# Patient Record
Sex: FEMALE | Race: WHITE | Employment: FULL TIME | URBAN - METROPOLITAN AREA
[De-identification: names, ages, dates, MRNs, and addresses within clinical notes are randomized per-mention and may not be internally consistent; named-entity substitution may affect disease eponyms.]

---

## 2018-09-20 ENCOUNTER — HOSPITAL ENCOUNTER (OUTPATIENT)
Dept: PHYSICAL THERAPY | Age: 28
Discharge: HOME | End: 2018-09-20
Payer: COMMERCIAL

## 2018-09-20 PROCEDURE — 97140 MANUAL THERAPY 1/> REGIONS: CPT | Performed by: PHYSICAL THERAPIST

## 2018-09-20 PROCEDURE — 97162 PT EVAL MOD COMPLEX 30 MIN: CPT | Performed by: PHYSICAL THERAPIST

## 2018-09-20 NOTE — PROGRESS NOTES
PHYSICAL THERAPY - DAILY TREATMENT NOTE    Patient Name: Alejandra Prieto        Date: 2018  : 1990   YES Patient  Verified  Visit #:      8  Insurance: Payor: Erika Murrell / Plan: [address] PT / Product Type: Commerical /      In time: 930 Out time: 1020   Total Treatment Time: 50     TREATMENT AREA =  Neck,    SUBJECTIVE  Pain Level (on 0 to 10 scale):  5  / 10   Medication Changes/New allergies or changes in medical history, any new surgeries or procedures? NO    If yes, update Summary List   Subjective Functional Status/Changes:  []  No changes reported     See IE       OBJECTIVE    10 min Manual Therapy: Technique:      [x] S/DTM []IASTM []PROM [x] Passive Stretching   []manual TPR    []Jt manipulation:Gr I [] II []  III [] IV[] V[]  Treatment Area:  C/s paraspinals, UT, OA release,    Rationale:      decrease pain, increase ROM, increase tissue extensibility and decrease trigger points to improve patient's ability to  improve patient's ability to perform ADL's with decreased pain      NC min Therapeutic Exercise:  [x]  See flow sheet   Rationale:      increase ROM and increase strength to improve the patients ability to  improve patient's ability to perform ADL's with decreased pain       throughout therapy min Patient Education:  YES  Reviewed HEP   []  Progressed/Changed HEP based on:   Educated the pt on anatomy of current condition, educated the patient on proper sleeping posture and initiated initial HEP.  See chart     Other Objective/Functional Measures:    See IE     Post Treatment Pain Level (on 0 to 10) scale:   5  / 10     ASSESSMENT  Assessment/Changes in Function:     See POC     []  See Progress Note/Recertification   Patient will continue to benefit from skilled PT services to see POC   Progress toward goals / Updated goals:    See POC     PLAN  [x]  Upgrade activities as tolerated YES Continue plan of care   []  Discharge due to :    []  Other:      Therapist: Hiwot Toribio KISHA Koo PT    Date: 9/20/2018 Time: 12:52 PM     Future Appointments  Date Time Provider Darian Whitt   9/24/2018 3:00 PM Raine Neville [facility] AT [facility]   9/28/2018 8:30 AM Courtney ESPOSITO PTA REHAB CENTER AT [facility]   10/2/2018 10:30 AM Noemi Koo, JENN REH[facility] CENTER AT [facility]

## 2018-09-20 NOTE — PROGRESS NOTES
[facility] PHYSICAL THERAPY AT [facility] [address] [place], [address] - Phone: [phone]  Fax: [phone] / [address] [facility]  Patient Name: Yvette Walker : 1990   Medical   Diagnosis: Neck pain [M54.2] Treatment Diagnosis: cervicalgia   Onset Date: 2018     Referral Source: Dayton Smith MD Start of Care [facility]): 2018   Prior Hospitalization: See medical history Provider #: [id number]   Prior Level of Function: Pain free   Comorbidities: SI joint pain   Medications: Verified on Patient Summary List   The Plan of Care and following information is based on the information from the initial evaluation. Assessment / key information:  The Ella Saravia is a 33 yo female [facility] [place] with c/o cervical pain due to insidious onset in 2018. 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 She denies numbness or radicular symptoms to UE's. She reports her max pain is a 7/10 which occurs with turning the \"wrong direction\" but isn't sure which way and lasts for short duration. Minimum pain = 0/10. Upon evaluation, the pt presents with poor posture - thoracic kyphosis, forward shoulders, forward head. She has decreased cervical ROM: flex 40, ext 43 degrees with increased cervical pain, R/L SB 26/24 degrees, B rotation = 40 degrees. UE strength is grossly 5/5 B throughout, sensation is intact to light touch. Special tests: cervical compression, Spurlings are (-), cervical traction is relieving. She has trigger points to R>L UT, LS, OA tightness, hypomobility to thoracic spine, hypomobile and tender C2, C4 vertebrae. Functionally she is limited with rotation making driving difficult and FOTO = 47/100 indicating functional limitations.   The pt would benefit from skilled PT services to address these deficits.  ========================================================================================  Eval Complexity: History MEDIUM  Complexity : 1-2 comorbidities / personal factors will impact the outcome/ POC ;  Examination  HIGH Complexity : 4+ Standardized tests and measures addressing body structure, function, activity limitation and / or participation in recreation ; Presentation MEDIUM Complexity : Evolving with changing characteristics ; Decision Making MEDIUM Complexity : FOTO score of 26-74; Overall Complexity MEDIUM    Problem List: pain affecting function, decrease ROM, decrease strength, decrease ADL/ functional abilitiies, decrease activity tolerance and decrease flexibility/ joint mobility. Treatment Plan may include any combination of the following: Therapeutic exercise, Therapeutic activities, Neuromuscular re-education, Physical agent/modality, Manual therapy and Patient education   Patient / Family readiness to learn indicated by: asking questions, trying to perform skills and interest   Persons(s) to be included in education: patient (P)   Barriers to Learning/Limitations: no   Measures taken:     Patient Goal (s):  \"less neck pain\"   Rehabilitation Potential:excellent  Patient self reported health status: good  Short Term Goals: To be accomplished in 2 weeks:  1) Establish HEP to prevent further disability. 2) Decrease max pain by 25%-50% to reach maximum function   3) The patient will be able to rotate cervical spine to 50 degrees B for ability to turn for safe driving. Long Term Goals: To be accomplished in 4 weeks:  1) Patient to be independent & compliant with HEP in preparation for D/C.   2) Decrease maximum pain by 75% or greater to prepare for discharge.    3) The patient will have a 19 point change in function per FOTO   4)  The patient will be able to rotate cervical spine to 60 degrees B for ability to turn for safe driving. Frequency / Duration: Patient to be seen 2 times per week for 4-6  weeks: . Patient / Caregiver education and instruction: activity modification and exercises  Therapist Signature: Nola Gorman PT Date: 7/37/2070   Certification Period:  Time: 9:41 AM   ===========================================================================================  I certify that the above Physical Therapy Services are being furnished while the patient is under my care. I agree with the treatment plan and certify that this therapy is necessary. Physician Signature:        Date:       Time:     Please sign and return to [facility] or you may fax the signed copy to [phone]. Thank you.

## 2018-09-24 ENCOUNTER — HOSPITAL ENCOUNTER (OUTPATIENT)
Dept: PHYSICAL THERAPY | Age: 28
Discharge: HOME | End: 2018-09-24
Payer: COMMERCIAL

## 2018-09-24 PROCEDURE — 97140 MANUAL THERAPY 1/> REGIONS: CPT

## 2018-09-24 PROCEDURE — 97110 THERAPEUTIC EXERCISES: CPT

## 2018-09-24 NOTE — PROGRESS NOTES
PHYSICAL THERAPY - DAILY TREATMENT NOTE    Patient Name: Juanita River        Date: 2018  : 1990    Patient  Verified: YES  Visit #:   2   of   12  Insurance: Payor: Dorothy Fu / Plan: [address] PT / Product Type: Commerical /      In time: 3:00 Out time: 3:55   Total Treatment Time: 55     Medicare Time Tracking (below)   Total Timed Codes (min):  - 1:1 Treatment Time:  -     TREATMENT AREA/ DIAGNOSIS = Neck pain [M54.2]    SUBJECTIVE  Pain Level (on 0 to 10 scale):  4  / 10   Medication Changes/New allergies or changes in medical history, any new surgeries or procedures? NO    If yes, update Summary List   Subjective Functional Status/Changes:  []  No changes reported     Pt reports that she is feeling about the same as last visit. Pt states that its probably because she worked two night shifts over the weekend. OBJECTIVE  Modalities Rationale:     decrease pain to improve patient's ability to perform ADLs without pain     min [] Estim, type/location:                                      []  att     []  unatt     []  w/US     []  w/ice    []  w/heat    min []  Mechanical Traction: type/lbs                   []  pro   []  sup   []  int   []  cont    []  before manual    []  after manual    min []  Ultrasound, settings/location:      min []  Iontophoresis w/ dexamethasone, location:                                               []  take home patch       []  in clinic   10 min []  Ice     [x]  Heat    location/position: C/s in supine    min []  Vasopneumatic Device, press/temp:     min []  Other:    [] Skin assessment post-treatment (if applicable):    []  intact    []  redness- no adverse reaction     []redness - adverse reaction:        15 min Manual Therapy: STM to c/s musculature. Grade 3/4 c/s downglides. Grade 4 t/s pa mobs.  Grade 5 pa mob to t/s   Rationale:      increase ROM, increase tissue extensibility and decrease trigger points to improve patient's ability to perform ADLs without pain      30 min Therapeutic Exercise:  [x]  See flow sheet   Rationale:      increase ROM, increase strength and improve coordination to improve the patients ability to perform ADLs without pain           Throughout treatment session min Patient Education:  Yes    [x] Reviewed HEP   []  Progressed/Changed HEP based on: Other Objective/Functional Measures:    Pt had no increase in pain during treatment session. Initiated c/s strengthening and scapular strengthening program.    Post Treatment Pain Level (on 0 to 10) scale:   4  / 10     ASSESSMENT  Assessment/Changes in Function:     Pt benefited from manual therapy based on improved pain free ROM of the c/s post treatment. Pt had a cavitation during grade 5 pa mob of t/s. Pt reported feeling much more loose after mobilzation. Performed 1/2 foam t/s extensions on the chair to maintain t/s ext     []  See Progress Note/Recertification   Patient will continue to benefit from skilled PT services to modify and progress therapeutic interventions, address functional mobility deficits, address strength deficits, analyze and address soft tissue restrictions and analyze and cue movement patterns to attain remaining goals. Progress toward goals / Updated goals:    Continue with current treatment plan.      PLAN  [x]  Upgrade activities as tolerated YES Continue plan of care   []  Discharge due to :    []  Other:      Therapist: Emmanuel Saravia ,PT     Date: 9/24/2018 Time: 3:28 PM        Future Appointments  Date Time Provider Darian Whitt   9/28/2018 8:30 AM Amy Montgomery V, PTA REHAB CENTER AT [facility]   10/2/2018 10:30 AM El Koo, PT REHAB CENTER AT [facility]

## 2018-09-28 ENCOUNTER — HOSPITAL ENCOUNTER (OUTPATIENT)
Dept: PHYSICAL THERAPY | Age: 28
Discharge: HOME | End: 2018-09-28
Payer: COMMERCIAL

## 2018-09-28 PROCEDURE — 97110 THERAPEUTIC EXERCISES: CPT

## 2018-09-28 PROCEDURE — 97140 MANUAL THERAPY 1/> REGIONS: CPT

## 2018-09-28 NOTE — PROGRESS NOTES
PHYSICAL THERAPY - DAILY TREATMENT NOTE    Patient Name: Patrica Angelucci        Date: 2018  : 1990    Patient  Verified: YES  Visit #:   3   of   12  Insurance: Payor: Sandy Mcrae / Plan:  [address] / Product Type: Commerical /      In time: 8:30 Out time: 9:30   Total Treatment Time: 60     Medicare Time Tracking (below)   Total Timed Codes (min):  - 1:1 Treatment Time:  -     TREATMENT AREA/ DIAGNOSIS = Neck pain [M54.2]    SUBJECTIVE  Pain Level (on 0 to 10 scale): 0-6  / 10   Medication Changes/New allergies or changes in medical history, any new surgeries or procedures? NO    If yes, update Summary List   Subjective Functional Status/Changes:  []  No changes reported     \"Doing better overall. Still pain with a lot of looking down at work. \"       OBJECTIVE  Modalities Rationale:     decrease pain to improve patient's ability to perform ADLs without pain     min [] Estim, type/location:                                      []  att     []  unatt     []  w/US     []  w/ice    []  w/heat    min []  Mechanical Traction: type/lbs                   []  pro   []  sup   []  int   []  cont    []  before manual    []  after manual    min []  Ultrasound, settings/location:      min []  Iontophoresis w/ dexamethasone, location:                                               []  take home patch       []  in clinic   10 min []  Ice     [x]  Heat    location/position: C/s in supine    min []  Vasopneumatic Device, press/temp:     min []  Other:    [x] Skin assessment post-treatment (if applicable):    [x]  intact    []  redness- no adverse reaction     []redness - adverse reaction:        15 min Manual Therapy: STM to C/S paraspinals f/b passive stretching. SOR.     Rationale:      increase ROM, increase tissue extensibility and decrease trigger points to improve patient's ability to perform ADLs without pain    35 min Therapeutic Exercise:  [x]  See flow sheet   Rationale:      increase ROM, increase strength and improve coordination to improve the patients ability to perform ADLs without pain     X min Patient Education:  Yes    [x] Reviewed HEP   []  Progressed/Changed HEP based on: Other Objective/Functional Measures: Added 1/2 foam roll x 3'. Trigger pointed R medial border scap. Tender suboccipital region, R>L. Post Treatment Pain Level (on 0 to 10) scale:   0-4  / 10     ASSESSMENT  Assessment/Changes in Function:     Improved passive/active cervical rotation following MT with no pain. Instructed in TPR with tennis ball and cervical retraction to utilize at work/car following prolonged flex. []  See Progress Note/Recertification   Patient will continue to benefit from skilled PT services to modify and progress therapeutic interventions, address functional mobility deficits, address strength deficits, analyze and address soft tissue restrictions and analyze and cue movement patterns to attain remaining goals. Progress toward goals / Updated goals:    Compliant with HEP. Progressing towards STGs #2 and #3.       PLAN  [x]  Upgrade activities as tolerated YES Continue plan of care   []  Discharge due to :    []  Other:      Therapist: Raven Betancur PTA     Date: 9/28/2018 Time: 3:28 PM        Future Appointments  Date Time Provider Darian Whitt   9/28/2018 8:30 AM Jihan Brooks V PTA REHAB CENTER AT [facility]   10/2/2018 10:30 AM Creig Simmonds Vidrine, PT REHAB CENTER AT [facility]

## 2018-10-02 ENCOUNTER — HOSPITAL ENCOUNTER (OUTPATIENT)
Dept: PHYSICAL THERAPY | Age: 28
Discharge: HOME | End: 2018-10-02
Payer: COMMERCIAL

## 2018-10-02 PROCEDURE — 97140 MANUAL THERAPY 1/> REGIONS: CPT | Performed by: PHYSICAL THERAPIST

## 2018-10-02 PROCEDURE — 97110 THERAPEUTIC EXERCISES: CPT | Performed by: PHYSICAL THERAPIST

## 2018-10-02 NOTE — PROGRESS NOTES
PHYSICAL THERAPY - DAILY TREATMENT NOTE    Patient Name: Fay Quinn        Date: 10/2/2018  : 1990   YES Patient  Verified  Visit #:     Insurance: Payor: Symone Santos / [address] / Product Type: Commerical /      In time: 6069 Out time: 1120   Total Treatment Time: 40     TREATMENT AREA =  Neck pain [M54.2]    SUBJECTIVE  Pain Level (on 0 to 10 scale):  4  / 10   Medication Changes/New allergies or changes in medical history, any new surgeries or procedures? NO    If yes, update Summary List   Subjective Functional Status/Changes:  []  No changes reported     Functional improvements: less pain, exercises help reduce pain  Functional impairments: ongoing pain if she looks down. OBJECTIVE  Modalities Rationale:     decrease pain and increase tissue extensibility to improve patient's ability to  improve patient's ability to perform ADL's with decreased pain     min [] Estim, type/location:                                      []  att     []  unatt     []  w/US     []  w/ice    []  w/heat    min []  Mechanical Traction: type/lbs                   []  pro   []  sup   []  int   []  cont    []  before manual    []  after manual    min []  Ultrasound, settings/location:      min []  Iontophoresis w/ dexamethasone, location:                                               []  take home patch       []  in clinic   10 min []  Ice     [x]  Heat    location/position:     min []  Vasopneumatic Device, press/temp:     min []  Other:    [x] Skin assessment post-treatment (if applicable):    []  intact    []  redness- no adverse reaction     []redness - adverse reaction:        15 min Manual Therapy: Technique:      [x] S/DTM []IASTM []PROM [x] Passive Stretching   [x]manual TPR    [x]Jt manipulation:Gr I [] II []  III [x] IV[] V[]  Treatment Area:  C/s paraspinals, scalene, SCM, UT. OA release, Grade III PA mobs C7-2 and R lateral glides C3-4.    Rationale:      decrease pain, increase ROM, increase tissue extensibility and decrease trigger points to improve patient's ability to  improve patient's ability to perform ADL's with decreased pain      15 min Therapeutic Exercise:  [x]  See flow sheet   Rationale:      increase ROM and increase strength to improve the patients ability to turn head with less pain. min Gait Training:    Rationale:        throughout therapy min Patient Education:  Gerardo Mercedes   []  Progressed/Changed HEP based on: Other Objective/Functional Measures:    C3-4 facet stuck closed on R. Post Treatment Pain Level (on 0 to 10) scale:   5  / 10     ASSESSMENT  Assessment/Changes in Function:     The pt with restrictions to R lateral cervical spine to C3-4. Continue to emphasize proper posture with working. []  See Progress Note/Recertification   Patient will continue to benefit from skilled PT services to modify and progress therapeutic interventions, address functional mobility deficits, address ROM deficits, address strength deficits, analyze and address soft tissue restrictions, analyze and cue movement patterns and analyze and modify body mechanics/ergonomics to attain remaining goals. Progress toward goals / Updated goals:    . ............. Melecio Sun Progressing towards goals, will monitor and progress as able      PLAN  [x]  Upgrade activities as tolerated YES Continue plan of care   []  Discharge due to :    []  Other:      Therapist: Eda Sol PT    Date: 10/2/2018 Time: 10:54 AM     No future appointments.

## 2018-10-08 ENCOUNTER — HOSPITAL ENCOUNTER (OUTPATIENT)
Dept: PHYSICAL THERAPY | Age: 28
Discharge: HOME | End: 2018-10-08
Payer: COMMERCIAL

## 2018-10-08 PROCEDURE — 97140 MANUAL THERAPY 1/> REGIONS: CPT

## 2018-10-08 PROCEDURE — 97110 THERAPEUTIC EXERCISES: CPT

## 2018-10-08 NOTE — PROGRESS NOTES
PHYSICAL THERAPY - DAILY TREATMENT NOTE    Patient Name: May Sanchez        Date: 10/8/2018  : 1990   YES Patient  Verified  Visit #:     Insurance: Payor: Catrachito Gaona / Plan: [address] [place] [address] Rd PT / Product Type: Commerical /      In time: 9:30 Out time: 10:20   Total Treatment Time: 50     TREATMENT AREA =  Neck pain [M54.2]    SUBJECTIVE  Pain Level (on 0 to 10 scale):  3-4   10   Medication Changes/New allergies or changes in medical history, any new surgeries or procedures? NO    If yes, update Summary List   Subjective Functional Status/Changes:  []  No changes reported     \"I feel like it is finally getting better. Less pain with looking down, more of a pull. I feel I can rotate my head more. \"       OBJECTIVE  Modalities Rationale:     decrease pain and increase tissue extensibility to improve patient's ability to  improve patient's ability to perform ADL's with decreased pain     min [] Estim, type/location:                                      []  att     []  unatt     []  w/US     []  w/ice    []  w/heat    min []  Mechanical Traction: type/lbs                   []  pro   []  sup   []  int   []  cont    []  before manual    []  after manual    min []  Ultrasound, settings/location:      min []  Iontophoresis w/ dexamethasone, location:                                               []  take home patch       []  in clinic   10 min []  Ice     [x]  Heat    location/position: To C/S in supine    min []  Vasopneumatic Device, press/temp:     min []  Other:    [x] Skin assessment post-treatment (if applicable):    []  intact    []  redness- no adverse reaction     []redness - adverse reaction:        15 min Manual Therapy: STM to C/S paraspinals f/b passive stretching. PA mobs C2-7. SOR.     Rationale:      decrease pain, increase ROM, increase tissue extensibility and decrease trigger points to improve patient's ability to  improve patient's ability to perform ADL's with decreased pain    25 min Therapeutic Exercise:  [x]  See flow sheet   Rationale:      increase ROM and increase strength to improve the patients ability to turn head with less pain. X min Patient Education:  YES  Reviewed HEP   []  Progressed/Changed HEP based on: Other Objective/Functional Measures:    Tender C3-4. Post Treatment Pain Level (on 0 to 10) scale:   2  / 10     ASSESSMENT  Assessment/Changes in Function:     Progressing well overall with less pain and improved CROM. []  See Progress Note/Recertification   Patient will continue to benefit from skilled PT services to modify and progress therapeutic interventions, address functional mobility deficits, address ROM deficits, address strength deficits, analyze and address soft tissue restrictions, analyze and cue movement patterns and analyze and modify body mechanics/ergonomics to attain remaining goals. Progress toward goals / Updated goals:    . Progressing towards goals, will monitor and progress as able      PLAN  [x]  Upgrade activities as tolerated YES Continue plan of care   []  Discharge due to :    []  Other:      Therapist: Poonam Wiggins PTA    Date: 10/8/2018 Time: 10:54 AM     Future Appointments  Date Time Provider Darian Whitt   10/8/2018 9:30 AM Sabra Jain [facility] AT [facility]   10/12/2018 1:00 PM Derek Olivia REHAB [facility]ENTER AT [facility]

## 2018-10-12 ENCOUNTER — HOSPITAL ENCOUNTER (OUTPATIENT)
Dept: PHYSICAL THERAPY | Age: 28
Discharge: HOME | End: 2018-10-12
Payer: COMMERCIAL

## 2018-10-12 PROCEDURE — 97110 THERAPEUTIC EXERCISES: CPT

## 2018-10-12 PROCEDURE — 97140 MANUAL THERAPY 1/> REGIONS: CPT

## 2018-10-12 NOTE — PROGRESS NOTES
PHYSICAL THERAPY - DAILY TREATMENT NOTE    Patient Name: Dipak Maier        Date: 10/12/2018  : 1990    Patient  Verified: YES  Visit #:     Insurance: Payor: Luis July / Plan:  [facility] [address] [facility] / Product Type: Commerical /      In time: 1:00 Out time: 2:00   Total Treatment Time: 60     Medicare Time Tracking (below)   Total Timed Codes (min):  - 1:1 Treatment Time:  -     TREATMENT AREA/ DIAGNOSIS = Neck pain [M54.2]    SUBJECTIVE  Pain Level (on 0 to 10 scale):  3  / 10   Medication Changes/New allergies or changes in medical history, any new surgeries or procedures? NO    If yes, update Summary List   Subjective Functional Status/Changes:  []  No changes reported     Pt reports that her neck is feeling pretty good today. Pt reports that she is able to move a little more than before. OBJECTIVE  Modalities Rationale:     decrease pain to improve patient's ability to perform ADLs without pain     min [] Estim, type/location:                                      []  att     []  unatt     []  w/US     []  w/ice    []  w/heat    min []  Mechanical Traction: type/lbs                   []  pro   []  sup   []  int   []  cont    []  before manual    []  after manual    min []  Ultrasound, settings/location:      min []  Iontophoresis w/ dexamethasone, location:                                               []  take home patch       []  in clinic   10 min []  Ice     [x]  Heat    location/position: C/s supine    min []  Vasopneumatic Device, press/temp:     min []  Other:    [] Skin assessment post-treatment (if applicable):    []  intact    []  redness- no adverse reaction     []redness - adverse reaction:        10 min Manual Therapy: STM to c/s musculature.  PROM to c/s    Rationale:      decrease pain and increase ROM to improve patient's ability to perform ADLs without pain      40 min Therapeutic Exercise:  [x]  See flow sheet   Rationale:      increase ROM, increase strength and improve coordination to improve the patients ability to perform ADLs without pain           Throughout treatment session min Patient Education:  Yes    [x] Reviewed HEP   []  Progressed/Changed HEP based on: Other Objective/Functional Measures:    Pt had no increase in pain during treatment session. Post Treatment Pain Level (on 0 to 10) scale:   3  / 10     ASSESSMENT  Assessment/Changes in Function:     Pt showed some forward head posture when performing rows and extensions. Pt was cued for proper mechanics. Pt also had increase scapular elevation with scapular squeezes     []  See Progress Note/Recertification   Patient will continue to benefit from skilled PT services to modify and progress therapeutic interventions, address functional mobility deficits, address ROM deficits, address strength deficits and analyze and address soft tissue restrictions to attain remaining goals. Progress toward goals / Updated goals:    Pt has improved pain free ROM. PLAN  [x]  Upgrade activities as tolerated YES Continue plan of care   []  Discharge due to :    []  Other:      Therapist: Opal PazPT     Date: 10/12/2018 Time: 1:25 PM        No future appointments.

## 2019-02-11 NOTE — PROGRESS NOTES
[address] PHYSICAL THERAPY AT [facility] [address] [place], [address] [facility] [address]  Phone: [phone]  Fax: [phone] SUMMARY  Patient Name: Zo Gómez : 1990   Treatment/Medical Diagnosis: Neck pain [M54.2]   Referral Source: Jakob Del Toro MD     Date of Initial Visit: 2018 Attended Visits: 6 Missed Visits: 0     SUMMARY OF TREATMENT  PT consisted of manual therapy techniques, therapeutic exercises, and modalities to improved ROM and decrease pain levels. CURRENT STATUS/ GOAL STATUS  Unable to further assess progress towards goals at this time due to pt no longer coming to PT. At last visit, Pt reported pain levels at 3/10 and improved mobility in the neck. Pt had c/s [facility] [place] [place] at this time with no further instructions to the patient. Thank you for this referral.    RECOMMENDATIONS  Discontinue therapy due to lack of attendance or compliance. If you have any questions/comments please contact us directly at [phone]. Thank you for allowing us to assist in the care of your patient.     Therapist Signature: Haley Steele Date: 2019   Reporting Period:   - Time: 3:44 PM